# Patient Record
Sex: FEMALE | Race: OTHER
[De-identification: names, ages, dates, MRNs, and addresses within clinical notes are randomized per-mention and may not be internally consistent; named-entity substitution may affect disease eponyms.]

---

## 2021-05-01 ENCOUNTER — HOSPITAL ENCOUNTER (EMERGENCY)
Dept: HOSPITAL 63 - ER | Age: 41
Discharge: HOME | End: 2021-05-01
Payer: OTHER GOVERNMENT

## 2021-05-01 VITALS
HEIGHT: 64 IN | WEIGHT: 132.28 LBS | BODY MASS INDEX: 22.58 KG/M2 | DIASTOLIC BLOOD PRESSURE: 85 MMHG | SYSTOLIC BLOOD PRESSURE: 136 MMHG

## 2021-05-01 DIAGNOSIS — L40.9: ICD-10-CM

## 2021-05-01 DIAGNOSIS — M25.511: Primary | ICD-10-CM

## 2021-05-01 PROCEDURE — 99283 EMERGENCY DEPT VISIT LOW MDM: CPT

## 2021-05-01 NOTE — PHYS DOC
Past History


Past Medical History:  Other


Past Surgical History:  No Surgical History


Alcohol Use:  Occasionally


Drug Use:  None





General Adult


EDM:


Chief Complaint:  UPPER EXTREMITY PAIN





HPI:


HPI:


40-year-old female past medical history of psoriasis with right wrist arthritis 

on Humira, presents the ED with complaints of right shoulder pain that started 

on Wednesday this week stating " it feels like there is a rock in there," with 

swelling and painful range of motion of her right shoulder with pain in the 

right pectoralis muscle and deltoid muscle.  Patient receives her first maternal

vaccine on 4/20 in her right deltoid.  Was worried about getting the vaccine due

to being on Humira and is concerned about getting her second vaccine dose on 

Tuesday.  Cannot recall any specific trauma to her right arm or overuse injury 

(is right hand dominant).  Is unemployed. Does throw her ball for her dog.





Review of Systems:


Review of Systems:


Constitutional:  Denies fever or chills 


Eyes:  Denies change in visual acuity 


HENT:  Denies nasal congestion or sore throat 


Respiratory:  Denies cough or shortness of breath 


Cardiovascular:  Denies chest pain or edema 


GI:  Denies abdominal pain, nausea, vomiting, bloody stools or diarrhea 


: Denies dysuria 


Musculoskeletal:  Denies back pain or joint pain 


Integument:  Denies rash 


Neurologic:  Denies headache, focal weakness or sensory changes 


Endocrine:  Denies polyuria or polydipsia 


Lymphatic:  Denies swollen glands 


Psychiatric:  Denies depression or anxiety





Allergies:


Allergies:





Allergies








Coded Allergies Type Severity Reaction Last Updated Verified


 


  No Known Drug Allergies    2/3/16 No











Physical Exam:


PE:





Constitutional: Well developed, well nourished, no acute distress, non-toxic 

appearance. 


HENT: Normocephalic, atraumatic,


Eyes: EOMI, conjunctiva normal, no discharge.  


Neck: Normal range of motion,  supple, no midline neck pain


Cardiovascular: S1/2 present, regular rhythm


Lungs & Thorax: Speaking in full sentences, bilateral equal chest rise, no 

tachypnea or increased work of breathing


Abdomen:  soft, no tenderness, 


Skin: Warm, dry, no erythema, no rash. [] 


Back: No midline tenderness, no CVA tenderness. [] 


Extremities: no cyanosis, equal radial pulses, right deltoid with no 

nodules/rash/swelling, normal upper extremity sensation and muscle strength, 

painful shoulder rom, negative neers/drop can test/hawkings, no pain at 

bicipital groove


Neurologic: Alert and oriented X 3, normal motor function, normal sensory 

function, no focal deficits noted. []


Psychologic: Affect normal, judgement normal, mood normal. []





Current Patient Data:


Vital Signs:





                                   Vital Signs








  Date Time  Temp Pulse Resp B/P (MAP) Pulse Ox O2 Delivery O2 Flow Rate FiO2


 


5/1/21 08:15 97.7 99 16 136/85 (102) 99 Room Air  











EKG:


EKG:


[]





Radiology/Procedures:


Radiology/Procedures:


[]





Heart Score:


C/O Chest Pain:  No


Risk Factors:


Risk Factors:  DM, Current or recent (<one month) smoker, HTN, HLP, family 

history of CAD, obesity.


Risk Scores:


Score 0 - 3:  2.5% MACE over next 6 weeks - Discharge Home


Score 4 - 6:  20.3% MACE over next 6 weeks - Admit for Clinical Observation


Score 7 - 10:  72.7% MACE over next 6 weeks - Early Invasive Strategies





Course & Med Decision Making:


Course & Med Decision Making


Pertinent Labs and Imaging studies reviewed. (See chart for details)





I reviewed CDCs website and pain, redness and swelling from Moderna vaccine 

usually starts within a day or 2 of keeping the vaccine and go away within a few

 days.  In clinical trials, reactive denies any symptoms can happen within 7 

days again vaccinated, patient was vaccinated 11 days ago.  I do not suspect her

 shoulder pain is related to the vaccine.  I suspect muscular strain versus 

rotator cuff injury/overuse injury. Offered shoulder sling. Will give 1 dose of 

dexamethasone in the ED.  Patient declined muscle relaxer prescription.  

Recommend over-the-counter analgesia, rice instructions.  Will discharge home 

with strict ED return precautions were given for rash, severe pain, neurologic 

deficits or injury. Encouraged urgent outpatient follow-up with PMD on Monday 

and Ortho for definitive management of rotator cuff injury.  Life-threatening 

processes were considered but are low suspicion at this time, given history, 

physical exam and ED workup. Pt was educated on all prescription medications and

 adverse effects.  All patient's questions were answered and pt was stable at 

time of discharge.





Life/limb-threatening differential includes but is not limited to, trauma 

(fracture, dislocation, laceration, compartment syndrome, tendon or ligament 

injury), neurovascular injury or deficitcva/tia, infection (osteomyelitis, 

abscess, cellulitis, septic arthritis, necrotizing fasciitis), deep vein 

thrombosis, renal/cardiac/liver disease, medication adverse effect, lymp

hedema/anasarca, vascular insufficiency or malignancy, 





I spoken with the patient and her caregivers.  I explained the patient's 

condition, diagnoses and treatment plan based on the information available to me

 at this time.  I have answered the patient and her caregiver's questions and 

addressed any concerns.  The patient and her caregivers have a good 

understanding of patient's diagnosis, condition and treatment plan as can be 

expected at this point.  Vital signs have been stable.  Patient's condition is 

stable and appropriate for discharge from the emergency department. 





Patient will pursue further outpatient evaluation with primary care physician or

 other designated or consulting physician as outlined in the discharge 

instructions.  The patient and/or caregivers are agreeable to this plan of care 

and follow-up instructions have been explained in detail.  The patient and/or 

caregivers have received these instructions in written form and have expressed 

an understanding of the discharge instructions.  The patient and/or caregivers 

are aware that any significant change of condition or worsening of symptoms 

should prompt immediate return to this or the closest emergency department or 

call to [id number] Disclaimer:


Dragon Disclaimer:


This electronic medical record was generated, in whole or in part, using a voice

 recognition dictation system.





Departure


Departure:


Impression:  


   Primary Impression:  


   Shoulder pain, right


Disposition:  01 HOME / SELF CARE / HOMELESS


Condition:  STABLE


Referrals:  


ОЛЕГ BEAN MD (PCP)


follow up with pcp on Monday


Patient Instructions:  RICE - Routine Care for Injuries, Shoulder Pain





Additional Instructions:  


FOLLOW UP WITH ORTHOPEDICS: For definitive management of rotator cuff





[facility]


[address] [place] [id number]


[phone]





EMERGENCY DEPARTMENT GENERAL DISCHARGE INSTRUCTIONS





Thank you for coming to [facility] (ED) today and trusting us

 with you 


care.  We trust that you had a positivie experience in our Emergency Department.

  If you 


wish to speak to the department management, you may call the director at 

[phone].





YOUR FOLLOW UP INSTRUCTIONS ARE AS FOLLOWS:





1.  Do you have a private Doctor?  If you do not have a private doctor, please 

ask for a 


resource list of physicians or clinics that may be able to assist you with 

follow up care.





2.  The Emergency Physician has interpreted your x-rays.  The X-Ray specialist 

will also 


review them.  If there is a change in the findings, you will be notified in 48 

hours when at 


all possible.





3.  A lab test or culture has been done, your results will be reviewed and you 

will be 


notified if you need a change in treatment.





ADDITIONAL INSTRUCTIONS AND INFORMATION:





1.  Your care today has been supervised by a physician who is specially trained 

in emergency 


care.  Many problems require more than one evaluation for a complete diagnosis 

and 


treatment.  We recommend that you schedule your follow up appointment as 

recommended to 


ensure complete treatment of you illness or injury.  If you are unable to obtain

 follow up 


care and continue to have a problem, or if your condition worsens, we recommend 

that you 


return to the ED.





2.  We are not able to safely determine your condition over the phone nor are we

 able to 


give sound medical advice over the phone.  For these safety reasons, if you call

 for medical 


advice we will ask you to come to the ED for further evaluation.





3.  If you have any questions regarding these discharge instructions please call

 the ED at 


[phone].





SAFETY INFORMATION:





In the interest of safety, wellness, and injury prevention; we encourage you to 

wear your 


sealbelt, if you smoke; quite smoking, and we encourage family to use a 

protective helmet 


for bicycling and other sporting events that present an increased risk for head 

injury.





IF YOUR SYMPTOMS WORSEN OR NEW SYMPTOMS DEVELOP, OR YOU HAVE CONCERNS ABOUT YOUR

 CONDITION; 


OR IF YOUR CONDITION WORSENS WHILE YOU ARE WAITING FOR YOUR FOLLOW UP 

APPOINTMENT; EITHER 


CONTACT YOUR PRIMARY CARE DOCTOR, THE PHYSICIAN WHOSE NAME AND NUMBER YOU WERE 

GIVEN, OR 


RETURN TO THE ED IMMEDIATELY.











AARON RAY DO                May 1, 2021 08:54